# Patient Record
Sex: FEMALE
[De-identification: names, ages, dates, MRNs, and addresses within clinical notes are randomized per-mention and may not be internally consistent; named-entity substitution may affect disease eponyms.]

---

## 2023-01-18 ENCOUNTER — APPOINTMENT (OUTPATIENT)
Dept: ORTHOPEDIC SURGERY | Facility: CLINIC | Age: 58
End: 2023-01-18
Payer: OTHER MISCELLANEOUS

## 2023-01-18 VITALS — HEIGHT: 61 IN | BODY MASS INDEX: 29.27 KG/M2 | WEIGHT: 155 LBS

## 2023-01-18 DIAGNOSIS — E78.00 PURE HYPERCHOLESTEROLEMIA, UNSPECIFIED: ICD-10-CM

## 2023-01-18 DIAGNOSIS — Z00.00 ENCOUNTER FOR GENERAL ADULT MEDICAL EXAMINATION W/OUT ABNORMAL FINDINGS: ICD-10-CM

## 2023-01-18 PROCEDURE — 99072 ADDL SUPL MATRL&STAF TM PHE: CPT

## 2023-01-18 PROCEDURE — 99214 OFFICE O/P EST MOD 30 MIN: CPT | Mod: 25

## 2023-01-18 PROCEDURE — 20606 DRAIN/INJ JOINT/BURSA W/US: CPT

## 2023-01-18 PROCEDURE — 76942 ECHO GUIDE FOR BIOPSY: CPT | Mod: LT

## 2023-01-18 PROCEDURE — 73610 X-RAY EXAM OF ANKLE: CPT | Mod: 50

## 2023-01-18 PROCEDURE — J3490M: CUSTOM | Mod: LT

## 2023-01-18 NOTE — ASSESSMENT
[FreeTextEntry1] : No clear findings on the RT.\par \par We discussed repeating the steroid injection on the LT which was helpful previously, she opts for this.\par \par WBAT in supportive footwear.\par Ice to affected area.\par NSAIDS prn.

## 2023-01-18 NOTE — HISTORY OF PRESENT ILLNESS
[7] : 7 [5] : 5 [Dull/Aching] : dull/aching [de-identified] : Patient is a 58 year old F who presents today for evaluation of both ankles, LT worse than RT. She has a history of LT lateral malleolar avulsion fracture and LT ankle synovitis. Steroid injection in the past for LT ankle was very helpful. No new or trauma reported. Symptoms have returned. WB without assistive device. She continues to work regular duty.  [FreeTextEntry1] : B/L ankles [FreeTextEntry5] : L>R had previous injury in te left ankle and has seen Dr Sage and was told by a  to close the case for her left ankle got injured on 1/28/20. Right ankle started hurting as well. pain located lateral worse with activity has tried Advil

## 2023-01-18 NOTE — PROCEDURE
[FreeTextEntry3] : Patient has tried OTC's including aspirin, Ibuprofen, Aleve, etc or prescription NSAIDS, and/or exercises at home and/or physical therapy without satisfactory response and the risks benefits, and alternatives have been discussed, and verbal consent was obtained.\par \par The risks, benefits and contents of the injection have been discussed.  Risks include but are not limited to allergic reaction, flare reaction, permanent white skin discoloration at the injection site and infection.  The patient understands the risks and agrees to having the injection.  All questions have been answered.\par \par An injection of the LT ankle joint was performed. The indication for this procedure was pain and inflammation. The site was prepped with alcohol and sterile technique used. An injection of 1cc of Lidocaine 1% , 1cc of Ropivacaine 0.5% ,and 1cc of 80mg Methylprednisolone (Depomedrol) was used. Patient tolerated procedure well. Patient was advised to call if redness, pain, or fever occur, apply ice for 15 minutes out of every hour for the next 12-24 hours as tolerated and patient was advised to rest the joint(s) for 3 days.\par \par Ultrasound guidance was indicated for this patient due to inflammation. All ultrasound images have been permanently captured and stored accordingly in our picture archiving and communication system. Visualization of the needle and placement of injection was performed without complication.\par

## 2023-01-18 NOTE — WORK
[Mild Partial] : mild partial [Does not reveal pre-existing condition(s) that may affect treatment/prognosis] : does not reveal pre-existing condition(s) that may affect treatment/prognosis [N/A] : : Not Applicable [Patient] : patient [No Rx restrictions] : No Rx restrictions. [I provided the services listed above] :  I provided the services listed above. [FreeTextEntry1] : good [FreeTextEntry2] : Pt. is working regular duty.

## 2023-01-18 NOTE — PHYSICAL EXAM
[Left] : left foot and ankle [Right] : right foot and ankle [NL (40)] : plantar flexion 40 degrees [NL 30)] : inversion 30 degrees [NL (20)] : eversion 20 degrees [5___] : [facility] 5[unfilled]/5 [2+] : posterior tibialis pulse: 2+ [Normal] : saphenous nerve sensation normal [Bilateral] : ankle bilaterally [Weight -] : weightbearing [] : non-antalgic [There are no fractures, subluxations or dislocations. No significant abnormalities are seen] : There are no fractures, subluxations or dislocations. No significant abnormalities are seen [FreeTextEntry3] : Minimal lateral ankle swelling.\par  [de-identified] : eversion 10 degrees [TWNoteComboBox7] : dorsiflexion 15 degrees

## 2023-03-22 ENCOUNTER — APPOINTMENT (OUTPATIENT)
Dept: ORTHOPEDIC SURGERY | Facility: CLINIC | Age: 58
End: 2023-03-22
Payer: OTHER MISCELLANEOUS

## 2023-03-22 DIAGNOSIS — M65.9 SYNOVITIS AND TENOSYNOVITIS, UNSPECIFIED: ICD-10-CM

## 2023-03-22 PROCEDURE — 99214 OFFICE O/P EST MOD 30 MIN: CPT

## 2023-03-22 NOTE — ASSESSMENT
[FreeTextEntry1] : No clear findings on the RT.\par \par Patient has recovered well on the left.\par She has no restrictions and can continue activities as tolerated.

## 2023-03-22 NOTE — PHYSICAL EXAM
[Left] : left foot and ankle [Right] : right foot and ankle [NL (40)] : plantar flexion 40 degrees [NL 30)] : inversion 30 degrees [5___] : [facility] 5[unfilled]/5 [2+] : posterior tibialis pulse: 2+ [Normal] : saphenous nerve sensation normal [Bilateral] : ankle bilaterally [There are no fractures, subluxations or dislocations. No significant abnormalities are seen] : There are no fractures, subluxations or dislocations. No significant abnormalities are seen [Weight -] : weightbearing [NL (20)] : eversion 20 degrees [] : non-antalgic [FreeTextEntry3] : Minimal lateral ankle swelling.\par  [de-identified] : eversion 10 degrees [TWNoteComboBox7] : dorsiflexion 15 degrees

## 2023-03-22 NOTE — HISTORY OF PRESENT ILLNESS
[7] : 7 [Dull/Aching] : dull/aching [Sudden] : sudden [3] : 3 [Intermittent] : intermittent [de-identified] : Patient returns for her ankles.   She has a history of LT lateral malleolar avulsion fracture and LT ankle synovitis. Steroid injection to the left ankle on 1/18/23 has relieved her symptoms.  She no longer has pain.  Her right ankle had no pathology last visit.  Cypriot translation aided by her .  She is currently working full duty at a [employer]. [] : no [FreeTextEntry1] : B/L ankles [FreeTextEntry5] : L>R had previous injury in te left ankle and has seen Dr Sage and was told by a  to close the case for her left ankle got injured on 1/28/20. Right ankle started hurting as well. pain located lateral worse with activity has tried Advil

## 2023-03-22 NOTE — WORK
[Does not reveal pre-existing condition(s) that may affect treatment/prognosis] : does not reveal pre-existing condition(s) that may affect treatment/prognosis [N/A] : : Not Applicable [Patient] : patient [No Rx restrictions] : No Rx restrictions. [I provided the services listed above] :  I provided the services listed above. [FreeTextEntry1] : good [FreeTextEntry2] : Pt. is working regular duty.